# Patient Record
Sex: MALE | Race: WHITE | NOT HISPANIC OR LATINO | Employment: OTHER | URBAN - METROPOLITAN AREA
[De-identification: names, ages, dates, MRNs, and addresses within clinical notes are randomized per-mention and may not be internally consistent; named-entity substitution may affect disease eponyms.]

---

## 2023-08-01 ENCOUNTER — HOSPITAL ENCOUNTER (OUTPATIENT)
Facility: HOSPITAL | Age: 63
Setting detail: OBSERVATION
Discharge: HOME | End: 2023-08-03
Attending: EMERGENCY MEDICINE | Admitting: INTERNAL MEDICINE
Payer: COMMERCIAL

## 2023-08-01 ENCOUNTER — APPOINTMENT (OUTPATIENT)
Dept: GENERAL RADIOLOGY | Facility: HOSPITAL | Age: 63
End: 2023-08-01
Payer: COMMERCIAL

## 2023-08-01 DIAGNOSIS — I20.8 ANGINA AT REST: Primary | ICD-10-CM

## 2023-08-01 DIAGNOSIS — R07.9 CHEST PAIN, UNSPECIFIED TYPE: ICD-10-CM

## 2023-08-01 DIAGNOSIS — E78.5 DYSLIPIDEMIA: ICD-10-CM

## 2023-08-01 DIAGNOSIS — I10 ESSENTIAL HYPERTENSION: ICD-10-CM

## 2023-08-01 LAB
ANION GAP SERPL CALCULATED.3IONS-SCNC: 15 MMOL/L (ref 5–15)
BASOPHILS # BLD AUTO: 0.1 10*3/MM3 (ref 0–0.2)
BASOPHILS NFR BLD AUTO: 1 % (ref 0–1.5)
BUN SERPL-MCNC: 16 MG/DL (ref 8–23)
BUN/CREAT SERPL: 14.2 (ref 7–25)
CALCIUM SPEC-SCNC: 9.4 MG/DL (ref 8.6–10.5)
CHLORIDE SERPL-SCNC: 95 MMOL/L (ref 98–107)
CO2 SERPL-SCNC: 25 MMOL/L (ref 22–29)
CREAT SERPL-MCNC: 1.13 MG/DL (ref 0.76–1.27)
D DIMER PPP FEU-MCNC: 0.44 MG/L (FEU) (ref 0–0.63)
DEPRECATED RDW RBC AUTO: 44.6 FL (ref 37–54)
EGFRCR SERPLBLD CKD-EPI 2021: 73 ML/MIN/1.73
EOSINOPHIL # BLD AUTO: 0.2 10*3/MM3 (ref 0–0.4)
EOSINOPHIL NFR BLD AUTO: 2.7 % (ref 0.3–6.2)
ERYTHROCYTE [DISTWIDTH] IN BLOOD BY AUTOMATED COUNT: 12.8 % (ref 12.3–15.4)
GEN 5 2HR TROPONIN T REFLEX: 43 NG/L
GLUCOSE SERPL-MCNC: 101 MG/DL (ref 65–99)
HCT VFR BLD AUTO: 39.4 % (ref 37.5–51)
HGB BLD-MCNC: 13.4 G/DL (ref 13–17.7)
LYMPHOCYTES # BLD AUTO: 1.7 10*3/MM3 (ref 0.7–3.1)
LYMPHOCYTES NFR BLD AUTO: 20.9 % (ref 19.6–45.3)
MCH RBC QN AUTO: 34.4 PG (ref 26.6–33)
MCHC RBC AUTO-ENTMCNC: 34 G/DL (ref 31.5–35.7)
MCV RBC AUTO: 101 FL (ref 79–97)
MONOCYTES # BLD AUTO: 0.9 10*3/MM3 (ref 0.1–0.9)
MONOCYTES NFR BLD AUTO: 11.6 % (ref 5–12)
NEUTROPHILS NFR BLD AUTO: 5.1 10*3/MM3 (ref 1.7–7)
NEUTROPHILS NFR BLD AUTO: 63.8 % (ref 42.7–76)
NRBC BLD AUTO-RTO: 0 /100 WBC (ref 0–0.2)
PLATELET # BLD AUTO: 218 10*3/MM3 (ref 140–450)
PMV BLD AUTO: 7.7 FL (ref 6–12)
POTASSIUM SERPL-SCNC: 4.5 MMOL/L (ref 3.5–5.2)
RBC # BLD AUTO: 3.9 10*6/MM3 (ref 4.14–5.8)
SODIUM SERPL-SCNC: 135 MMOL/L (ref 136–145)
TROPONIN T DELTA: 14 NG/L
TROPONIN T SERPL HS-MCNC: 29 NG/L
WBC NRBC COR # BLD: 8.1 10*3/MM3 (ref 3.4–10.8)

## 2023-08-01 PROCEDURE — 85025 COMPLETE CBC W/AUTO DIFF WBC: CPT | Performed by: EMERGENCY MEDICINE

## 2023-08-01 PROCEDURE — 36415 COLL VENOUS BLD VENIPUNCTURE: CPT

## 2023-08-01 PROCEDURE — 93005 ELECTROCARDIOGRAM TRACING: CPT | Performed by: EMERGENCY MEDICINE

## 2023-08-01 PROCEDURE — G0378 HOSPITAL OBSERVATION PER HR: HCPCS

## 2023-08-01 PROCEDURE — 85379 FIBRIN DEGRADATION QUANT: CPT | Performed by: EMERGENCY MEDICINE

## 2023-08-01 PROCEDURE — 84484 ASSAY OF TROPONIN QUANT: CPT | Performed by: EMERGENCY MEDICINE

## 2023-08-01 PROCEDURE — 71045 X-RAY EXAM CHEST 1 VIEW: CPT

## 2023-08-01 PROCEDURE — 80048 BASIC METABOLIC PNL TOTAL CA: CPT | Performed by: EMERGENCY MEDICINE

## 2023-08-01 PROCEDURE — 99284 EMERGENCY DEPT VISIT MOD MDM: CPT

## 2023-08-01 RX ORDER — POLYETHYLENE GLYCOL 3350 17 G/17G
17 POWDER, FOR SOLUTION ORAL DAILY PRN
Status: DISCONTINUED | OUTPATIENT
Start: 2023-08-01 | End: 2023-08-03 | Stop reason: HOSPADM

## 2023-08-01 RX ORDER — ONDANSETRON 2 MG/ML
4 INJECTION INTRAMUSCULAR; INTRAVENOUS EVERY 6 HOURS PRN
Status: DISCONTINUED | OUTPATIENT
Start: 2023-08-01 | End: 2023-08-03 | Stop reason: HOSPADM

## 2023-08-01 RX ORDER — MELATONIN
1000 DAILY
Status: DISCONTINUED | OUTPATIENT
Start: 2023-08-02 | End: 2023-08-02

## 2023-08-01 RX ORDER — ASPIRIN 81 MG/1
81 TABLET, CHEWABLE ORAL NIGHTLY
Status: DISCONTINUED | OUTPATIENT
Start: 2023-08-01 | End: 2023-08-03 | Stop reason: HOSPADM

## 2023-08-01 RX ORDER — ACETAMINOPHEN 325 MG/1
650 TABLET ORAL EVERY 6 HOURS PRN
Status: DISCONTINUED | OUTPATIENT
Start: 2023-08-01 | End: 2023-08-03 | Stop reason: HOSPADM

## 2023-08-01 RX ORDER — NITROGLYCERIN 0.4 MG/1
0.4 TABLET SUBLINGUAL
Status: DISCONTINUED | OUTPATIENT
Start: 2023-08-01 | End: 2023-08-03 | Stop reason: HOSPADM

## 2023-08-01 RX ORDER — SODIUM CHLORIDE 0.9 % (FLUSH) 0.9 %
10 SYRINGE (ML) INJECTION AS NEEDED
Status: DISCONTINUED | OUTPATIENT
Start: 2023-08-01 | End: 2023-08-03 | Stop reason: HOSPADM

## 2023-08-01 RX ORDER — PROPRANOLOL HCL 60 MG
60 CAPSULE, EXTENDED RELEASE 24HR ORAL DAILY
COMMUNITY

## 2023-08-01 RX ORDER — ACETAMINOPHEN 325 MG/1
650 TABLET ORAL EVERY 6 HOURS PRN
Status: DISCONTINUED | OUTPATIENT
Start: 2023-08-01 | End: 2023-08-01 | Stop reason: SDUPTHER

## 2023-08-01 RX ORDER — SODIUM CHLORIDE 0.9 % (FLUSH) 0.9 %
10 SYRINGE (ML) INJECTION EVERY 12 HOURS SCHEDULED
Status: DISCONTINUED | OUTPATIENT
Start: 2023-08-02 | End: 2023-08-03 | Stop reason: HOSPADM

## 2023-08-01 RX ORDER — ROSUVASTATIN CALCIUM 5 MG/1
5 TABLET, COATED ORAL DAILY
COMMUNITY

## 2023-08-01 RX ORDER — BISACODYL 5 MG/1
5 TABLET, DELAYED RELEASE ORAL DAILY PRN
Status: DISCONTINUED | OUTPATIENT
Start: 2023-08-01 | End: 2023-08-03 | Stop reason: HOSPADM

## 2023-08-01 RX ORDER — ROSUVASTATIN CALCIUM 5 MG/1
5 TABLET, COATED ORAL NIGHTLY
Status: DISCONTINUED | OUTPATIENT
Start: 2023-08-01 | End: 2023-08-03 | Stop reason: HOSPADM

## 2023-08-01 RX ORDER — MELATONIN
1000 DAILY
COMMUNITY

## 2023-08-01 RX ORDER — LOSARTAN POTASSIUM 50 MG/1
100 TABLET ORAL NIGHTLY
Status: DISCONTINUED | OUTPATIENT
Start: 2023-08-01 | End: 2023-08-03 | Stop reason: HOSPADM

## 2023-08-01 RX ORDER — CLOTRIMAZOLE AND BETAMETHASONE DIPROPIONATE 10; .64 MG/G; MG/G
1 CREAM TOPICAL 2 TIMES DAILY PRN
Status: DISCONTINUED | OUTPATIENT
Start: 2023-08-01 | End: 2023-08-03 | Stop reason: HOSPADM

## 2023-08-01 RX ORDER — BISACODYL 10 MG
10 SUPPOSITORY, RECTAL RECTAL DAILY PRN
Status: DISCONTINUED | OUTPATIENT
Start: 2023-08-01 | End: 2023-08-03 | Stop reason: HOSPADM

## 2023-08-01 RX ORDER — LOSARTAN POTASSIUM 50 MG/1
100 TABLET ORAL DAILY
COMMUNITY

## 2023-08-01 RX ORDER — ASPIRIN 81 MG/1
81 TABLET, CHEWABLE ORAL DAILY
COMMUNITY

## 2023-08-01 RX ORDER — FOLIC ACID 1 MG/1
1 TABLET ORAL DAILY
COMMUNITY

## 2023-08-01 RX ORDER — SODIUM CHLORIDE 9 MG/ML
40 INJECTION, SOLUTION INTRAVENOUS AS NEEDED
Status: DISCONTINUED | OUTPATIENT
Start: 2023-08-01 | End: 2023-08-03 | Stop reason: HOSPADM

## 2023-08-01 RX ORDER — FAMOTIDINE 20 MG/1
20 TABLET, FILM COATED ORAL 2 TIMES DAILY
COMMUNITY

## 2023-08-01 RX ORDER — PROPRANOLOL HCL 60 MG
60 CAPSULE, EXTENDED RELEASE 24HR ORAL NIGHTLY
Status: DISCONTINUED | OUTPATIENT
Start: 2023-08-01 | End: 2023-08-03 | Stop reason: HOSPADM

## 2023-08-01 RX ORDER — AMOXICILLIN 250 MG
2 CAPSULE ORAL 2 TIMES DAILY
Status: DISCONTINUED | OUTPATIENT
Start: 2023-08-02 | End: 2023-08-03 | Stop reason: HOSPADM

## 2023-08-01 RX ORDER — FAMOTIDINE 20 MG/1
20 TABLET, FILM COATED ORAL 2 TIMES DAILY
Status: DISCONTINUED | OUTPATIENT
Start: 2023-08-01 | End: 2023-08-03 | Stop reason: HOSPADM

## 2023-08-01 RX ORDER — FOLIC ACID 1 MG/1
1 TABLET ORAL NIGHTLY
Status: DISCONTINUED | OUTPATIENT
Start: 2023-08-01 | End: 2023-08-03 | Stop reason: HOSPADM

## 2023-08-02 PROBLEM — E78.5 DYSLIPIDEMIA: Status: ACTIVE | Noted: 2023-08-01

## 2023-08-02 PROBLEM — I10 ESSENTIAL HYPERTENSION: Status: ACTIVE | Noted: 2023-08-01

## 2023-08-02 LAB
CHOLEST SERPL-MCNC: 153 MG/DL (ref 0–200)
HBA1C MFR BLD: 5.1 % (ref 4.8–5.6)
HDLC SERPL-MCNC: 75 MG/DL (ref 40–60)
LDLC SERPL CALC-MCNC: 56 MG/DL (ref 0–100)
LDLC/HDLC SERPL: 0.7 {RATIO}
MAGNESIUM SERPL-MCNC: 1.1 MG/DL (ref 1.6–2.4)
MAGNESIUM SERPL-MCNC: 1.7 MG/DL (ref 1.6–2.4)
QT INTERVAL: 379 MS
TRIGL SERPL-MCNC: 128 MG/DL (ref 0–150)
TROPONIN T SERPL HS-MCNC: 22 NG/L
VLDLC SERPL-MCNC: 22 MG/DL (ref 5–40)

## 2023-08-02 PROCEDURE — 25010000002 FENTANYL CITRATE (PF) 100 MCG/2ML SOLUTION: Performed by: INTERNAL MEDICINE

## 2023-08-02 PROCEDURE — 99152 MOD SED SAME PHYS/QHP 5/>YRS: CPT | Performed by: INTERNAL MEDICINE

## 2023-08-02 PROCEDURE — 25010000002 MIDAZOLAM PER 1 MG: Performed by: INTERNAL MEDICINE

## 2023-08-02 PROCEDURE — 93010 ELECTROCARDIOGRAM REPORT: CPT | Performed by: INTERNAL MEDICINE

## 2023-08-02 PROCEDURE — 84484 ASSAY OF TROPONIN QUANT: CPT | Performed by: NURSE PRACTITIONER

## 2023-08-02 PROCEDURE — 83036 HEMOGLOBIN GLYCOSYLATED A1C: CPT | Performed by: NURSE PRACTITIONER

## 2023-08-02 PROCEDURE — 96366 THER/PROPH/DIAG IV INF ADDON: CPT

## 2023-08-02 PROCEDURE — 25510000001 IOPAMIDOL PER 1 ML: Performed by: INTERNAL MEDICINE

## 2023-08-02 PROCEDURE — 25010000002 MAGNESIUM SULFATE 2 GM/50ML SOLUTION: Performed by: INTERNAL MEDICINE

## 2023-08-02 PROCEDURE — 80061 LIPID PANEL: CPT | Performed by: NURSE PRACTITIONER

## 2023-08-02 PROCEDURE — 93458 L HRT ARTERY/VENTRICLE ANGIO: CPT | Performed by: INTERNAL MEDICINE

## 2023-08-02 PROCEDURE — G0378 HOSPITAL OBSERVATION PER HR: HCPCS

## 2023-08-02 PROCEDURE — 0 LIDOCAINE 1 % SOLUTION: Performed by: INTERNAL MEDICINE

## 2023-08-02 PROCEDURE — 93005 ELECTROCARDIOGRAM TRACING: CPT | Performed by: INTERNAL MEDICINE

## 2023-08-02 PROCEDURE — 99204 OFFICE O/P NEW MOD 45 MIN: CPT | Performed by: INTERNAL MEDICINE

## 2023-08-02 PROCEDURE — 83735 ASSAY OF MAGNESIUM: CPT | Performed by: INTERNAL MEDICINE

## 2023-08-02 PROCEDURE — C1894 INTRO/SHEATH, NON-LASER: HCPCS | Performed by: INTERNAL MEDICINE

## 2023-08-02 PROCEDURE — 96365 THER/PROPH/DIAG IV INF INIT: CPT

## 2023-08-02 PROCEDURE — C1769 GUIDE WIRE: HCPCS | Performed by: INTERNAL MEDICINE

## 2023-08-02 RX ORDER — MIDAZOLAM HYDROCHLORIDE 1 MG/ML
INJECTION INTRAMUSCULAR; INTRAVENOUS
Status: DISCONTINUED | OUTPATIENT
Start: 2023-08-02 | End: 2023-08-02 | Stop reason: HOSPADM

## 2023-08-02 RX ORDER — SODIUM CHLORIDE 0.9 % (FLUSH) 0.9 %
3-10 SYRINGE (ML) INJECTION AS NEEDED
Status: DISCONTINUED | OUTPATIENT
Start: 2023-08-02 | End: 2023-08-02

## 2023-08-02 RX ORDER — DIPHENHYDRAMINE HCL 25 MG
25 CAPSULE ORAL EVERY 6 HOURS PRN
Status: DISCONTINUED | OUTPATIENT
Start: 2023-08-02 | End: 2023-08-03 | Stop reason: HOSPADM

## 2023-08-02 RX ORDER — SODIUM CHLORIDE 9 MG/ML
250 INJECTION, SOLUTION INTRAVENOUS ONCE AS NEEDED
Status: DISCONTINUED | OUTPATIENT
Start: 2023-08-02 | End: 2023-08-03 | Stop reason: HOSPADM

## 2023-08-02 RX ORDER — ACETAMINOPHEN 325 MG/1
650 TABLET ORAL EVERY 4 HOURS PRN
Status: DISCONTINUED | OUTPATIENT
Start: 2023-08-02 | End: 2023-08-03 | Stop reason: HOSPADM

## 2023-08-02 RX ORDER — SODIUM CHLORIDE 9 MG/ML
INJECTION, SOLUTION INTRAVENOUS
Status: COMPLETED | OUTPATIENT
Start: 2023-08-02 | End: 2023-08-02

## 2023-08-02 RX ORDER — ONDANSETRON 2 MG/ML
4 INJECTION INTRAMUSCULAR; INTRAVENOUS EVERY 6 HOURS PRN
Status: DISCONTINUED | OUTPATIENT
Start: 2023-08-02 | End: 2023-08-03 | Stop reason: HOSPADM

## 2023-08-02 RX ORDER — MELATONIN
1000 NIGHTLY
Status: DISCONTINUED | OUTPATIENT
Start: 2023-08-03 | End: 2023-08-03 | Stop reason: HOSPADM

## 2023-08-02 RX ORDER — ONDANSETRON 4 MG/1
4 TABLET, FILM COATED ORAL EVERY 6 HOURS PRN
Status: DISCONTINUED | OUTPATIENT
Start: 2023-08-02 | End: 2023-08-03 | Stop reason: HOSPADM

## 2023-08-02 RX ORDER — LIDOCAINE HYDROCHLORIDE 10 MG/ML
INJECTION, SOLUTION INFILTRATION; PERINEURAL
Status: DISCONTINUED | OUTPATIENT
Start: 2023-08-02 | End: 2023-08-02 | Stop reason: HOSPADM

## 2023-08-02 RX ORDER — LANOLIN ALCOHOL/MO/W.PET/CERES
1000 CREAM (GRAM) TOPICAL DAILY
Status: DISCONTINUED | OUTPATIENT
Start: 2023-08-02 | End: 2023-08-03 | Stop reason: HOSPADM

## 2023-08-02 RX ORDER — FENTANYL CITRATE 50 UG/ML
INJECTION, SOLUTION INTRAMUSCULAR; INTRAVENOUS
Status: DISCONTINUED | OUTPATIENT
Start: 2023-08-02 | End: 2023-08-02 | Stop reason: HOSPADM

## 2023-08-02 RX ORDER — SODIUM CHLORIDE 9 MG/ML
40 INJECTION, SOLUTION INTRAVENOUS AS NEEDED
Status: DISCONTINUED | OUTPATIENT
Start: 2023-08-02 | End: 2023-08-02

## 2023-08-02 RX ORDER — MAGNESIUM SULFATE HEPTAHYDRATE 40 MG/ML
2 INJECTION, SOLUTION INTRAVENOUS ONCE
Status: COMPLETED | OUTPATIENT
Start: 2023-08-02 | End: 2023-08-02

## 2023-08-02 RX ORDER — SODIUM CHLORIDE 0.9 % (FLUSH) 0.9 %
3 SYRINGE (ML) INJECTION EVERY 12 HOURS SCHEDULED
Status: DISCONTINUED | OUTPATIENT
Start: 2023-08-02 | End: 2023-08-02

## 2023-08-02 RX ADMIN — PROPRANOLOL HYDROCHLORIDE 60 MG: 60 CAPSULE, EXTENDED RELEASE ORAL at 20:21

## 2023-08-02 RX ADMIN — Medication 10 ML: at 08:40

## 2023-08-02 RX ADMIN — ROSUVASTATIN CALCIUM 5 MG: 5 TABLET, COATED ORAL at 20:21

## 2023-08-02 RX ADMIN — FAMOTIDINE 20 MG: 20 TABLET, FILM COATED ORAL at 08:40

## 2023-08-02 RX ADMIN — LOSARTAN POTASSIUM 100 MG: 50 TABLET, FILM COATED ORAL at 20:21

## 2023-08-02 RX ADMIN — ASPIRIN 81 MG: 81 TABLET, CHEWABLE ORAL at 17:58

## 2023-08-02 RX ADMIN — CYANOCOBALAMIN TAB 1000 MCG 1000 MCG: 1000 TAB at 09:23

## 2023-08-02 RX ADMIN — MAGNESIUM SULFATE HEPTAHYDRATE 2 G: 40 INJECTION, SOLUTION INTRAVENOUS at 09:23

## 2023-08-02 RX ADMIN — Medication 3 ML: at 09:24

## 2023-08-02 RX ADMIN — FAMOTIDINE 20 MG: 20 TABLET, FILM COATED ORAL at 17:20

## 2023-08-02 RX ADMIN — Medication 10 ML: at 00:33

## 2023-08-02 RX ADMIN — FOLIC ACID 1 MG: 1 TABLET ORAL at 20:21

## 2023-08-02 RX ADMIN — SENNOSIDES AND DOCUSATE SODIUM 2 TABLET: 50; 8.6 TABLET ORAL at 20:21

## 2023-08-02 RX ADMIN — Medication 10 ML: at 20:21

## 2023-08-02 NOTE — PROGRESS NOTES
[facility] Medicine Services   Daily Progress Note    Patient Name: Bijan Patel  : 1960  MRN: [id number]  Primary Care Physician:  Provider, No Known  Date of admission: 2023  Date and Time of Service: 23 at 0800      Subjective      Chief Complaint: chest pain    Patient seen and examined this am, no chest pain. Seen by cardiology-for C    ROS   Constitutional: Positive for diaphoresis.   Cardiovascular:  Positive for chest pain.        Extreme fatigue with activity   All other systems reviewed and are negative      Objective      Vitals:   Temp:  [97.7 øF (36.5 øC)-98.5 øF (36.9 øC)] 97.7 øF (36.5 øC)  Heart Rate:  [71-88] 84  Resp:  [15-18] 15  BP: (112-140)/(62-84) 112/68    Physical Exam   Constitutional:       Appearance: Normal appearance.   Eyes:      Pupils: Pupils are equal, round, and reactive to light.   Cardiovascular:      Rate and Rhythm: Normal rate and regular rhythm.   Pulmonary:      Effort: Pulmonary effort is normal.      Breath sounds: Normal breath sounds.   Abdominal:      Palpations: Abdomen is soft.   Musculoskeletal:         General: Normal range of motion.   Skin:     General: Skin is warm and dry.   Neurological:      Mental Status: He is alert and oriented to person, place, and time.   Psychiatric:         Behavior: Behavior normal.         Result Review    Result Review:  I have personally reviewed the results from the time of this admission to 2023 08:29 EDT and agree with these findings:  [x]  Laboratory  []  Microbiology  [x]  Radiology  []  EKG/Telemetry   []  Cardiology/Vascular   []  Pathology  []  Old records  []  Other:        Assessment & Plan      Brief Patient Summary:  Bijan Patel is a 62 y.o. male with PMHx of HTN HLD who presents with chest pain. Had normal stress test a few months ago. Troponin is mildly elevated. Seen by cardiology and will undergo cardiac catheterization later today.      aspirin, 81 mg, Oral,  Nightly  cholecalciferol, 1,000 Units, Oral, Daily  famotidine, 20 mg, Oral, BID  folic acid, 1 mg, Oral, Nightly  losartan, 100 mg, Oral, Nightly  propranolol LA, 60 mg, Oral, Nightly  rosuvastatin, 5 mg, Oral, Nightly  senna-docusate sodium, 2 tablet, Oral, BID  sodium chloride, 10 mL, Intravenous, Q12H  sodium chloride, 3 mL, Intravenous, Q12H             Active Hospital Problems:  Active Hospital Problems    Diagnosis     **Chest pain      Plan:   Chest pain, rule out ACS  - Chest pain now resolved  - continue tele  - pt reports had exercise stress, echo, and PFTs but unable to view results from outlying facility. Pt reports new symptoms of fatigue and intermittent episodes of chest pain  - seen by cardiology - cardiac cath later today  - trend troponin  - EKG with non-specific findings. Will monitor  - Ok for light breakfast for cardiac cath this evening     Chronic medical condition of HTN, HLD, GERD  - stable  - continue home medication    DVT prophylaxis:  Mechanical DVT prophylaxis orders are present.    CODE STATUS:    Code Status (Patient has no pulse and is not breathing): CPR (Attempt to Resuscitate)  Medical Interventions (Patient has pulse or is breathing): Full Support  Release to patient: Routine Release      Disposition:  I expect patient to be discharged in 1-2 days.    Electronically signed by Matt Loera MD, 08/02/23, 08:29 EDT.  [facility] Hospitalist Team

## 2023-08-02 NOTE — PLAN OF CARE
Goal Outcome Evaluation:  Plan of Care Reviewed With: patient           Outcome Evaluation: Pt admitted for chest pain. Pt has slept throughout the night, no c/o chest pain at this time. Pt is currently NPO for Cardiology consult in AM. Will continue to monitor.

## 2023-08-02 NOTE — PLAN OF CARE
Goal Outcome Evaluation:   Pt a/o. Room air. Spouse at bedside. Up ad holden.Cardiology following. Heart cath this afternoon. No complaints. Magnesium replaced. Call light in reach.

## 2023-08-02 NOTE — H&P
"    [facility] Medicine Services  History & Physical    Patient Name: Bijan Patel  : 1960  MRN: [id number]  Primary Care Physician:  Provider, No Known  Date of admission: 2023  Date and Time of Service: 23 at 2240    Subjective      Chief Complaint: Chest pain    History of Present Illness: Bijan Patel is a 62 y.o. male with past medical history acid reflux, hypertension, hyperlipidemia and celiac's disease and diagnosis of COVID-19 in March who presented to [facility] on 2023 complaining of chest pain    Patient reports his primary residence is in [place] but he also spends 6 months of the year in [place].  He was driving through the area on the way home from [place] when he had acute onset of chest pain that caused him to pull to the side of the road and called EMS for help.  Patient verbalizes that approximately 3 months ago he has had extensive work-up in [place] by Dr.Vivek Robison cardiologist in [place] [phone].  Patient reports underwent a treadmill stress test, sonogram that only showed \"plaque\".  He is followed by pulmonologist also and has had CAT scans chest x-rays and pulmonary function test.  He is pending sleep apnea test.    Patient reports was riding in the passenger seat of the vehicle when he all of a sudden had mid chest pain that intensified to a sharp pain and radiated down both arms and fingers and up to the neck.  He reports he had 3 \"attacks\" within an hour and a half that lasted approximately 12 to 13 minutes each.  Pain was associated with diaphoresis.  Denies any nausea or vomiting headache or vision changes.  By the time EMS arrived pain had subsided.  Patient reports been having worsening symptoms of \"exhaustion\" with activity.  Difficulty ambulating up a flight of stairs.  Denies any tobacco use.  Uses alcohol socially.  Father with heart disease in his 70s and a CVA in his 60s.      Review of Systems   Constitutional: Positive for " diaphoresis.   Cardiovascular:  Positive for chest pain.        Extreme fatigue with activity   All other systems reviewed and are negative.     Personal History     Past Medical History:   Diagnosis Date    Celiac disease     GERD (gastroesophageal reflux disease)     Hyperlipidemia     Hypertension        Past Surgical History:   Procedure Laterality Date    CHOLECYSTECTOMY      COMMON BILE DUCT EXPLORATION      common bile duct removed    ERCP      ROTATOR CUFF REPAIR         Family History: family history is not on file. Otherwise pertinent FHx was reviewed and not pertinent to current issue.    Social History:  reports that he has never smoked. He has never used smokeless tobacco. He reports current alcohol use of about 9.0 standard drinks per week. He reports that he does not use drugs.    Home Medications:  Prior to Admission Medications       Prescriptions Last Dose Informant Patient Reported? Taking?    aspirin 81 MG chewable tablet  Self Yes No    Chew 1 tablet Daily.    Cholecalciferol 25 MCG (1000 UT) tablet  Self Yes No    Take 1 tablet by mouth Daily.    famotidine (PEPCID) 20 MG tablet  Self Yes No    Take 1 tablet by mouth 2 (Two) Times a Day.    folic acid (FOLVITE) 1 MG tablet  Self Yes No    Take 1 tablet by mouth Daily.    losartan (COZAAR) 50 MG tablet  Self Yes No    Take 2 tablets by mouth Daily.    propranolol LA (INDERAL LA) 60 MG 24 hr capsule  Self Yes No    Take 1 capsule by mouth Daily.    rosuvastatin (CRESTOR) 5 MG tablet  Self Yes No    Take 1 tablet by mouth Daily.              Allergies:  Allergies   Allergen Reactions    Penicillins Hives       Objective      Vitals:   Temp:  [97.8 øF (36.6 øC)] 97.8 øF (36.6 øC)  Heart Rate:  [71-88] 88  Resp:  [17] 17  BP: (123-140)/(62-84) 132/78    Physical Exam  Constitutional:       Appearance: Normal appearance.   Eyes:      Pupils: Pupils are equal, round, and reactive to light.   Cardiovascular:      Rate and Rhythm: Normal rate and  regular rhythm.   Pulmonary:      Effort: Pulmonary effort is normal.      Breath sounds: Normal breath sounds.   Abdominal:      Palpations: Abdomen is soft.   Musculoskeletal:         General: Normal range of motion.   Skin:     General: Skin is warm and dry.   Neurological:      Mental Status: He is alert and oriented to person, place, and time.   Psychiatric:         Behavior: Behavior normal.        Result Review    Result Review:  I have personally reviewed the results from the time of this admission to 8/1/2023 23:52 EDT and agree with these findings:  [x]  Laboratory  []  Microbiology  [x]  Radiology  [x]  EKG/Telemetry   []  Cardiology/Vascular   []  Pathology  []  Old records  []  Other:  Most notable findings include:   CMP          8/1/2023    19:05   CMP   Glucose 101    BUN 16    Creatinine 1.13    EGFR 73.0    Sodium 135    Potassium 4.5    Chloride 95    Calcium 9.4    BUN/Creatinine Ratio 14.2    Anion Gap 15.0       CBC          8/1/2023    19:05   CBC   WBC 8.10    RBC 3.90    Hemoglobin 13.4    Hematocrit 39.4    .0    MCH 34.4    MCHC 34.0    RDW 12.8    Platelets 218       Troponin 29->43    CXR no acute pulmonary process      Assessment & Plan        Active Hospital Problems:  Active Hospital Problems    Diagnosis     **Chest pain      Plan:   Chest pain, rule out ACS  - Chest pain now resolved  - continue tele  - pt reports had exercise stress, echo, and PFTs but unable to view results from outlying facility. Pt reports new symptoms of fatigue and intermittent episodes of chest pain  - cardiology consult for possible cardiac cath  - trend troponin  - EKG with non-specific findings. Will monitor  -NPO after midnight    Chronic medical condition of HTN, HLD, GERD  - stable  - continue home medication    DVT prophylaxis:  Mechanical DVT prophylaxis orders are present.    CODE STATUS:    Code Status (Patient has no pulse and is not breathing): CPR (Attempt to Resuscitate)  Medical  Interventions (Patient has pulse or is breathing): Full Support  Release to patient: Routine Release    Admission Status:  I believe this patient meets observation status.    I discussed the patient's findings and my recommendations with patient.    This patient has been examined wearing appropriate Personal Protective Equipment       Signature: Electronically signed by HIREN White, 08/01/23, 23:52 EDT.  [facility] Hospitalist Team

## 2023-08-02 NOTE — ED NOTES
Pt had medication organizer with him. Educated to not take home medication here and wife took organizer to put in car. Both verbalized understanding. Provider also educated about this

## 2023-08-02 NOTE — CONSULTS
Referring Provider: Benton Sargent DO  Reason for Consultation:  Chest pain  Hypertension  Dyslipidemia    Patient Care Team:  Provider, No Known as PCP - General    Chief complaint  Chest pain    Subjective .     History of present illness:  Bijan Patel is a 62 y.o. male who presents with history of multiple medical problems including hypertension GERD dyslipidemia celiac disease and status post COVID 19 March 2023 was admitted to the hospital with history of chest pain.  Patient was in [place] and on the way back to [place] his home place and he started developing chest pain while he was in the car as a passenger.  Patient apparently spends 6 months in [place] and had cardiac work-up performed by cardiologist locally.  CT scan that showed calcium score of 700 and had negative stress Cardiolite test (by history) and apparently normal echocardiogram.  Patient was active and walking around and did not have any chest pain.  On the way patient developed recurrent episodes of chest pain substernal dull sharp pain and some heaviness not associated with any sweating nausea.  Apparently was lasting for few minutes and subsequently was lasting longer.  Patient was brought to the hospital.  EKG showed no acute changes.  Minimal elevation of troponin at 43 with a delta of 14.  Cardiology consultation was requested.          ROS      Patient is not having any shortness of breath, palpitations, dizziness or syncope.  Denies having any headache, abdominal pain, nausea, vomiting, diarrhea, constipation, loss of weight or loss of appetite.  Denies having any excessive bruising, hematuria or blood in the stool.    Review of all systems negative except as indicated      History  Past Medical History:   Diagnosis Date    Celiac disease     GERD (gastroesophageal reflux disease)     Hyperlipidemia     Hypertension        Past Surgical History:   Procedure Laterality Date    CHOLECYSTECTOMY      COMMON BILE DUCT EXPLORATION       common bile duct removed    ERCP      ROTATOR CUFF REPAIR         History reviewed. No pertinent family history.    Social History     Tobacco Use    Smoking status: Never    Smokeless tobacco: Never   Vaping Use    Vaping Use: Never used   Substance Use Topics    Alcohol use: Yes     Alcohol/week: 9.0 standard drinks     Types: 9 Shots of liquor per week    Drug use: Never        Medications Prior to Admission   Medication Sig Dispense Refill Last Dose    aspirin 81 MG chewable tablet Chew 1 tablet Daily.       Cholecalciferol 25 MCG (1000 UT) tablet Take 1 tablet by mouth Daily.       famotidine (PEPCID) 20 MG tablet Take 1 tablet by mouth 2 (Two) Times a Day.       folic acid (FOLVITE) 1 MG tablet Take 1 tablet by mouth Daily.       losartan (COZAAR) 50 MG tablet Take 2 tablets by mouth Daily.       propranolol LA (INDERAL LA) 60 MG 24 hr capsule Take 1 capsule by mouth Daily.       rosuvastatin (CRESTOR) 5 MG tablet Take 1 tablet by mouth Daily.            Penicillins    Scheduled Meds:aspirin, 81 mg, Oral, Nightly  cholecalciferol, 1,000 Units, Oral, Daily  famotidine, 20 mg, Oral, BID  folic acid, 1 mg, Oral, Nightly  losartan, 100 mg, Oral, Nightly  propranolol LA, 60 mg, Oral, Nightly  rosuvastatin, 5 mg, Oral, Nightly  senna-docusate sodium, 2 tablet, Oral, BID  sodium chloride, 10 mL, Intravenous, Q12H      Continuous Infusions:   PRN Meds:.  acetaminophen    senna-docusate sodium **AND** polyethylene glycol **AND** bisacodyl **AND** bisacodyl    clotrimazole-betamethasone    nitroglycerin    ondansetron    [COMPLETED] Insert Peripheral IV **AND** sodium chloride    sodium chloride    sodium chloride    Objective     VITAL SIGNS  Vitals:    08/01/23 2130 08/01/23 2145 08/02/23 0011 08/02/23 0343   BP: 140/84 132/78 135/72 112/68   BP Location:   Left arm Left arm   Patient Position:   Lying Lying   Pulse: 80 88 76 84   Resp:   18 15   Temp:   98.5 øF (36.9 øC) 97.7 øF (36.5 øC)   TempSrc:   Oral Oral  "  SpO2: 99% 98% 97% 98%   Weight:       Height:           Flowsheet Rows      Flowsheet Row First Filed Value   Admission Height 182.9 cm (72\") Documented at 08/01/2023 1840   Admission Weight 79.4 kg (175 lb) Documented at 08/01/2023 1840            No intake or output data in the 24 hours ending 08/02/23 0624     TELEMETRY: Sinus rhythm    Physical Exam:  The patient is alert, oriented and in no distress.  Vital signs as noted above.  Head and neck revealed no carotid bruits or jugular venous distention.  No thyromegaly or lymphadenopathy is present  Lungs clear.  No wheezing.  Breath sounds are normal bilaterally.  Heart normal first and second heart sounds. No murmur.  No precordial rub is present.  No gallop is present.  Abdomen soft and nontender.  No organomegaly is present.  Extremities with good peripheral pulses without any pedal edema.  Skin warm and dry.  Musculoskeletal system is grossly normal  CNS grossly normal      Results Review:   I reviewed the patient's new clinical results.  Lab Results (last 24 hours)       Procedure Component Value Units Date/Time    High Sensitivity Troponin T 2Hr [[id number]]  (Abnormal) Collected: 08/01/23 2052    Specimen: Blood Updated: 08/01/23 2126     HS Troponin T 43 ng/L      Troponin T Delta 14 ng/L     Narrative:      High Sensitive Troponin T Reference Range:  <10.0 ng/L- Negative Female for AMI  <15.0 ng/L- Negative Male for AMI  >=10 - Abnormal Female indicating possible myocardial injury.  >=15 - Abnormal Male indicating possible myocardial injury.   Clinicians would have to utilize clinical acumen, EKG, Troponin, and serial changes to determine if it is an Acute Myocardial Infarction or myocardial injury due to an underlying chronic condition.         D-dimer, Quantitative [[id number]]  (Normal) Collected: 08/01/23 1905    Specimen: Blood Updated: 08/01/23 1937     D-Dimer, Quantitative 0.44 mg/L (FEU)     Narrative:      According to the assay 's " "published package insert, a normal (<0.50 mg/L (FEU)) D-dimer result in conjunction with a non-high clinical probability assessment, excludes deep vein thrombosis (DVT) and pulmonary embolism (PE) with high sensitivity.    D-dimer values increase with age and this can make VTE exclusion of an older population difficult. To address this, the [employer] College [employer], based on best available evidence and recent guidelines, recommends that clinicians use age-adjusted D-dimer thresholds in patients greater than 50 years of age with: a) a low probability of PE who do not meet all Pulmonary Embolism Rule Out Criteria, or b) in those with intermediate probability of PE.   The formula for an age-adjusted D-dimer cut-off is \"age/100\".  For example, a 60 year old patient would have an age-adjusted cut-off of 0.60 mg/L (FEU) and an 80 year old 0.80 mg/L (FEU).    Basic Metabolic Panel [[id number]]  (Abnormal) Collected: 08/01/23 1905    Specimen: Blood Updated: 08/01/23 1928     Glucose 101 mg/dL      BUN 16 mg/dL      Creatinine 1.13 mg/dL      Sodium 135 mmol/L      Potassium 4.5 mmol/L      Comment: Slight hemolysis detected by analyzer. Results may be affected.        Chloride 95 mmol/L      CO2 25.0 mmol/L      Calcium 9.4 mg/dL      BUN/Creatinine Ratio 14.2     Anion Gap 15.0 mmol/L      eGFR 73.0 mL/min/1.73     Narrative:      GFR Normal >60  Chronic Kidney Disease <60  Kidney Failure <15      High Sensitivity Troponin T [[id number]]  (Abnormal) Collected: 08/01/23 1905    Specimen: Blood Updated: 08/01/23 1928     HS Troponin T 29 ng/L     Narrative:      High Sensitive Troponin T Reference Range:  <10.0 ng/L- Negative Female for AMI  <15.0 ng/L- Negative Male for AMI  >=10 - Abnormal Female indicating possible myocardial injury.  >=15 - Abnormal Male indicating possible myocardial injury.   Clinicians would have to utilize clinical acumen, EKG, Troponin, and serial changes to determine if it is an Acute " Myocardial Infarction or myocardial injury due to an underlying chronic condition.         CBC & Differential [[id number]]  (Abnormal) Collected: 08/01/23 1905    Specimen: Blood Updated: 08/01/23 1910    Narrative:      The following orders were created for panel order CBC & Differential.  Procedure                               Abnormality         Status                     ---------                               -----------         ------                     CBC Auto Differential[[id number]]        Abnormal            Final result                 Please view results for these tests on the individual orders.    CBC Auto Differential [[id number]]  (Abnormal) Collected: 08/01/23 1905    Specimen: Blood Updated: 08/01/23 1910     WBC 8.10 10*3/mm3      RBC 3.90 10*6/mm3      Hemoglobin 13.4 g/dL      Hematocrit 39.4 %      .0 fL      MCH 34.4 pg      MCHC 34.0 g/dL      RDW 12.8 %      RDW-SD 44.6 fl      MPV 7.7 fL      Platelets 218 10*3/mm3      Neutrophil % 63.8 %      Lymphocyte % 20.9 %      Monocyte % 11.6 %      Eosinophil % 2.7 %      Basophil % 1.0 %      Neutrophils, Absolute 5.10 10*3/mm3      Lymphocytes, Absolute 1.70 10*3/mm3      Monocytes, Absolute 0.90 10*3/mm3      Eosinophils, Absolute 0.20 10*3/mm3      Basophils, Absolute 0.10 10*3/mm3      nRBC 0.0 /100 WBC             Imaging Results (Last 24 Hours)       Procedure Component Value Units Date/Time    XR Chest 1 View [[id number]] Collected: 08/01/23 1918     Updated: 08/01/23 1921    Narrative:      XR CHEST 1 VW    Date of Exam: 8/1/2023 7:12 PM EDT    Indication: Chest pain    Comparison: None available.    Findings:  The heart is not enlarged. The lungs seem clear. There are no pleural effusions.      Impression:      Impression:  1.An acute pulmonary process is not apparent.      Electronically Signed: Neville Kennedy    8/1/2023 7:19 PM EDT    Workstation ID: [id number]        LAB RESULTS (LAST 7 DAYS)    CBC  Results from last 7 days   Lab  Units 08/01/23  1905   WBC 10*3/mm3 8.10   RBC 10*6/mm3 3.90*   HEMOGLOBIN g/dL 13.4   HEMATOCRIT % 39.4   MCV fL 101.0*   PLATELETS 10*3/mm3 218       BMP  Results from last 7 days   Lab Units 08/01/23  1905   SODIUM mmol/L 135*   POTASSIUM mmol/L 4.5   CHLORIDE mmol/L 95*   CO2 mmol/L 25.0   BUN mg/dL 16   CREATININE mg/dL 1.13   GLUCOSE mg/dL 101*       CMP   Results from last 7 days   Lab Units 08/01/23  1905   SODIUM mmol/L 135*   POTASSIUM mmol/L 4.5   CHLORIDE mmol/L 95*   CO2 mmol/L 25.0   BUN mg/dL 16   CREATININE mg/dL 1.13   GLUCOSE mg/dL 101*         BNP        TROPONIN  Results from last 7 days   Lab Units 08/01/23 2052   HSTROP T ng/L 43*       CoAg        Creatinine Clearance  Estimated Creatinine Clearance: 75.1 mL/min (by C-G formula based on SCr of 1.13 mg/dL).    ABG        Radiology  XR Chest 1 View    Result Date: 8/1/2023  Impression: 1.An acute pulmonary process is not apparent. Electronically Signed: Neville Kennedy  8/1/2023 7:19 PM EDT  Workstation ID: [id number]       EKG      I personally viewed and interpreted the patient's EKG/Telemetry data: Sinus rhythm without ischemic changes    ECHOCARDIOGRAM:        STRESS TEST        Cardiolite (Tc-99m sestamibi) stress test    HEART CATHETERIZATION  No results found for this or any previous visit.      OTHER:     Assessment & Plan     Principal Problem:    Chest pain  ]]]]]]]]]]]]]]]]]]]]]  Impression  =================  -Recurrent episodes of chest pain at rest-possible unstable angina pectoris although symptoms are somewhat atypical.  EKG showed no acute changes.  High-sensitivity troponin 29 and 43 with a delta of 14.    History of negative stress Cardiolite test and normal echocardiogram-performed in [place].  (By history)    - Hypertension dyslipidemia celiac disease GERD.    - Status post COVID March 2023.    - Status post cholecystectomy, bile duct stone removal rotator cuff repair.    - Family history of coronary artery disease    -  Non-smoker    - Allergic to penicillin.  ================  Plan  ============  Patient has chest pain acrania unstable pattern although somewhat atypical.  EKG showed no acute changes.  Minimal elevation of high-sensitivity troponin with delta of 14.  Recent ischemic cardiac work-up was negative for myocardial ischemia April 2023.  (By history).  Reports not available.  In view of recurrent episodes of chest pain and minimal elevation of high-sensitivity troponin patient was advised cardiac catheterization and coronary arteriography for definite evaluation of coronary artery status.  Risks and benefits pros and cons of the procedure including infection bleeding blood clot heart attack stroke allergic reaction to the dye renal dysfunction etc. were discussed.  We will try to obtain records from [place] regarding ischemic cardiac work-up.  Further plan will depend on patient's progress.  ]]]]]]]]]]]]]]]]]]]]]]]         Neil Pineda MD  08/02/23  06:24 EDT

## 2023-08-02 NOTE — ED PROVIDER NOTES
Subjective   History of Present Illness  Patient is a 62-year-old male complaining of pain in [place] throughout the day today.  States pain became worse and had 3 episodes today lasting approximately 15 minutes each time.  He has had intermittent pain over the past 3 months and had a negative stress test.  He denies cough fever shortness of breath or other complaint    Review of Systems    No past medical history on file.    Allergies   Allergen Reactions    Penicillins Hives       No past surgical history on file.    No family history on file.    Social History     Socioeconomic History    Marital status:            Objective   Physical Exam  Neck has no adenopathy JVD or bruits.  Lungs are clear.  Heart has regular rhythm without murmur.  Chest is nontender.  Abdomen soft nontender.  Extremity exam unremarkable.  Procedures       My EKG interpretation was normal sinus rhythm at a rate of 69 with no acute ST change    ED Course      Results for orders placed or performed during the hospital encounter of 08/01/23   Basic Metabolic Panel    Specimen: Blood   Result Value Ref Range    Glucose 101 (H) 65 - 99 mg/dL    BUN 16 8 - 23 mg/dL    Creatinine 1.13 0.76 - 1.27 mg/dL    Sodium 135 (L) 136 - 145 mmol/L    Potassium 4.5 3.5 - 5.2 mmol/L    Chloride 95 (L) 98 - 107 mmol/L    CO2 25.0 22.0 - 29.0 mmol/L    Calcium 9.4 8.6 - 10.5 mg/dL    BUN/Creatinine Ratio 14.2 7.0 - 25.0    Anion Gap 15.0 5.0 - 15.0 mmol/L    eGFR 73.0 >60.0 mL/min/1.73   High Sensitivity Troponin T    Specimen: Blood   Result Value Ref Range    HS Troponin T 29 (H) <15 ng/L   D-dimer, Quantitative    Specimen: Blood   Result Value Ref Range    D-Dimer, Quantitative 0.44 0.00 - 0.63 mg/L (FEU)   CBC Auto Differential    Specimen: Blood   Result Value Ref Range    WBC 8.10 3.40 - 10.80 10*3/mm3    RBC 3.90 (L) 4.14 - 5.80 10*6/mm3    Hemoglobin 13.4 13.0 - 17.7 g/dL    Hematocrit 39.4 37.5 - 51.0 %    .0 (H) 79.0 - 97.0 fL    MCH 34.4  (H) 26.6 - 33.0 pg    MCHC 34.0 31.5 - 35.7 g/dL    RDW 12.8 12.3 - 15.4 %    RDW-SD 44.6 37.0 - 54.0 fl    MPV 7.7 6.0 - 12.0 fL    Platelets 218 140 - 450 10*3/mm3    Neutrophil % 63.8 42.7 - 76.0 %    Lymphocyte % 20.9 19.6 - 45.3 %    Monocyte % 11.6 5.0 - 12.0 %    Eosinophil % 2.7 0.3 - 6.2 %    Basophil % 1.0 0.0 - 1.5 %    Neutrophils, Absolute 5.10 1.70 - 7.00 10*3/mm3    Lymphocytes, Absolute 1.70 0.70 - 3.10 10*3/mm3    Monocytes, Absolute 0.90 0.10 - 0.90 10*3/mm3    Eosinophils, Absolute 0.20 0.00 - 0.40 10*3/mm3    Basophils, Absolute 0.10 0.00 - 0.20 10*3/mm3    nRBC 0.0 0.0 - 0.2 /100 WBC   High Sensitivity Troponin T 2Hr    Specimen: Blood   Result Value Ref Range    HS Troponin T 43 (H) <15 ng/L    Troponin T Delta 14 (C) >=-4 - <+4 ng/L   ECG 12 Lead Chest Pain   Result Value Ref Range    QT Interval 379 ms     XR Chest 1 View    Result Date: 8/1/2023  Impression: 1.An acute pulmonary process is not apparent. Electronically Signed: Neville Kennedy  8/1/2023 7:19 PM EDT  Workstation ID: [id number]                                        Medical Decision Making  My chest x-ray interpretation shows no cardiomegaly effusion or infiltrate.  Patient has elevated of his troponin greater than 14 over 2 hours time.  There is no acute EKG change.  Metabolic panel is at baseline without renal insufficiency or electrolyte abnormality.  There is no evidence acute infectious process.  Patient will be admitted for further cardiac evaluation.  He is pain-free at this time however it is concerning about his troponin elevation.  I did speak to the on-call hospitalist.    Amount and/or Complexity of Data Reviewed  Labs: ordered. Decision-making details documented in ED Course.  Radiology: ordered and independent interpretation performed.  ECG/medicine tests: ordered and independent interpretation performed.    Risk  Prescription drug management.  Decision regarding hospitalization.        Final diagnoses:   Chest pain,  unspecified type       ED Disposition  ED Disposition       ED Disposition   Decision to Admit    Condition   --    Comment   --               No follow-up provider specified.       Medication List      No changes were made to your prescriptions during this visit.            Solo Laguna MD  08/01/23 8250

## 2023-08-02 NOTE — H&P (VIEW-ONLY)
Referring Provider: Benton Sargent DO  Reason for Consultation:  Chest pain  Hypertension  Dyslipidemia    Patient Care Team:  Provider, No Known as PCP - General    Chief complaint  Chest pain    Subjective .     History of present illness:  Bijan Patel is a 62 y.o. male who presents with history of multiple medical problems including hypertension GERD dyslipidemia celiac disease and status post COVID 19 March 2023 was admitted to the hospital with history of chest pain.  Patient was in [place] and on the way back to [place] his home place and he started developing chest pain while he was in the car as a passenger.  Patient apparently spends 6 months in [place] and had cardiac work-up performed by cardiologist locally.  CT scan that showed calcium score of 700 and had negative stress Cardiolite test (by history) and apparently normal echocardiogram.  Patient was active and walking around and did not have any chest pain.  On the way patient developed recurrent episodes of chest pain substernal dull sharp pain and some heaviness not associated with any sweating nausea.  Apparently was lasting for few minutes and subsequently was lasting longer.  Patient was brought to the hospital.  EKG showed no acute changes.  Minimal elevation of troponin at 43 with a delta of 14.  Cardiology consultation was requested.          ROS      Patient is not having any shortness of breath, palpitations, dizziness or syncope.  Denies having any headache, abdominal pain, nausea, vomiting, diarrhea, constipation, loss of weight or loss of appetite.  Denies having any excessive bruising, hematuria or blood in the stool.    Review of all systems negative except as indicated      History  Past Medical History:   Diagnosis Date    Celiac disease     GERD (gastroesophageal reflux disease)     Hyperlipidemia     Hypertension        Past Surgical History:   Procedure Laterality Date    CHOLECYSTECTOMY      COMMON BILE DUCT EXPLORATION       common bile duct removed    ERCP      ROTATOR CUFF REPAIR         History reviewed. No pertinent family history.    Social History     Tobacco Use    Smoking status: Never    Smokeless tobacco: Never   Vaping Use    Vaping Use: Never used   Substance Use Topics    Alcohol use: Yes     Alcohol/week: 9.0 standard drinks     Types: 9 Shots of liquor per week    Drug use: Never        Medications Prior to Admission   Medication Sig Dispense Refill Last Dose    aspirin 81 MG chewable tablet Chew 1 tablet Daily.       Cholecalciferol 25 MCG (1000 UT) tablet Take 1 tablet by mouth Daily.       famotidine (PEPCID) 20 MG tablet Take 1 tablet by mouth 2 (Two) Times a Day.       folic acid (FOLVITE) 1 MG tablet Take 1 tablet by mouth Daily.       losartan (COZAAR) 50 MG tablet Take 2 tablets by mouth Daily.       propranolol LA (INDERAL LA) 60 MG 24 hr capsule Take 1 capsule by mouth Daily.       rosuvastatin (CRESTOR) 5 MG tablet Take 1 tablet by mouth Daily.            Penicillins    Scheduled Meds:aspirin, 81 mg, Oral, Nightly  cholecalciferol, 1,000 Units, Oral, Daily  famotidine, 20 mg, Oral, BID  folic acid, 1 mg, Oral, Nightly  losartan, 100 mg, Oral, Nightly  propranolol LA, 60 mg, Oral, Nightly  rosuvastatin, 5 mg, Oral, Nightly  senna-docusate sodium, 2 tablet, Oral, BID  sodium chloride, 10 mL, Intravenous, Q12H      Continuous Infusions:   PRN Meds:.  acetaminophen    senna-docusate sodium **AND** polyethylene glycol **AND** bisacodyl **AND** bisacodyl    clotrimazole-betamethasone    nitroglycerin    ondansetron    [COMPLETED] Insert Peripheral IV **AND** sodium chloride    sodium chloride    sodium chloride    Objective     VITAL SIGNS  Vitals:    08/01/23 2130 08/01/23 2145 08/02/23 0011 08/02/23 0343   BP: 140/84 132/78 135/72 112/68   BP Location:   Left arm Left arm   Patient Position:   Lying Lying   Pulse: 80 88 76 84   Resp:   18 15   Temp:   98.5 øF (36.9 øC) 97.7 øF (36.5 øC)   TempSrc:   Oral Oral  "  SpO2: 99% 98% 97% 98%   Weight:       Height:           Flowsheet Rows      Flowsheet Row First Filed Value   Admission Height 182.9 cm (72\") Documented at 08/01/2023 1840   Admission Weight 79.4 kg (175 lb) Documented at 08/01/2023 1840            No intake or output data in the 24 hours ending 08/02/23 0624     TELEMETRY: Sinus rhythm    Physical Exam:  The patient is alert, oriented and in no distress.  Vital signs as noted above.  Head and neck revealed no carotid bruits or jugular venous distention.  No thyromegaly or lymphadenopathy is present  Lungs clear.  No wheezing.  Breath sounds are normal bilaterally.  Heart normal first and second heart sounds. No murmur.  No precordial rub is present.  No gallop is present.  Abdomen soft and nontender.  No organomegaly is present.  Extremities with good peripheral pulses without any pedal edema.  Skin warm and dry.  Musculoskeletal system is grossly normal  CNS grossly normal      Results Review:   I reviewed the patient's new clinical results.  Lab Results (last 24 hours)       Procedure Component Value Units Date/Time    High Sensitivity Troponin T 2Hr [[id number]]  (Abnormal) Collected: 08/01/23 2052    Specimen: Blood Updated: 08/01/23 2126     HS Troponin T 43 ng/L      Troponin T Delta 14 ng/L     Narrative:      High Sensitive Troponin T Reference Range:  <10.0 ng/L- Negative Female for AMI  <15.0 ng/L- Negative Male for AMI  >=10 - Abnormal Female indicating possible myocardial injury.  >=15 - Abnormal Male indicating possible myocardial injury.   Clinicians would have to utilize clinical acumen, EKG, Troponin, and serial changes to determine if it is an Acute Myocardial Infarction or myocardial injury due to an underlying chronic condition.         D-dimer, Quantitative [[id number]]  (Normal) Collected: 08/01/23 1905    Specimen: Blood Updated: 08/01/23 1937     D-Dimer, Quantitative 0.44 mg/L (FEU)     Narrative:      According to the assay 's " "published package insert, a normal (<0.50 mg/L (FEU)) D-dimer result in conjunction with a non-high clinical probability assessment, excludes deep vein thrombosis (DVT) and pulmonary embolism (PE) with high sensitivity.    D-dimer values increase with age and this can make VTE exclusion of an older population difficult. To address this, the [employer], based on best available evidence and recent guidelines, recommends that clinicians use age-adjusted D-dimer thresholds in patients greater than 50 years of age with: a) a low probability of PE who do not meet all Pulmonary Embolism Rule Out Criteria, or b) in those with intermediate probability of PE.   The formula for an age-adjusted D-dimer cut-off is \"age/100\".  For example, a 60 year old patient would have an age-adjusted cut-off of 0.60 mg/L (FEU) and an 80 year old 0.80 mg/L (FEU).    Basic Metabolic Panel [[id number]]  (Abnormal) Collected: 08/01/23 1905    Specimen: Blood Updated: 08/01/23 1928     Glucose 101 mg/dL      BUN 16 mg/dL      Creatinine 1.13 mg/dL      Sodium 135 mmol/L      Potassium 4.5 mmol/L      Comment: Slight hemolysis detected by analyzer. Results may be affected.        Chloride 95 mmol/L      CO2 25.0 mmol/L      Calcium 9.4 mg/dL      BUN/Creatinine Ratio 14.2     Anion Gap 15.0 mmol/L      eGFR 73.0 mL/min/1.73     Narrative:      GFR Normal >60  Chronic Kidney Disease <60  Kidney Failure <15      High Sensitivity Troponin T [[id number]]  (Abnormal) Collected: 08/01/23 1905    Specimen: Blood Updated: 08/01/23 1928     HS Troponin T 29 ng/L     Narrative:      High Sensitive Troponin T Reference Range:  <10.0 ng/L- Negative Female for AMI  <15.0 ng/L- Negative Male for AMI  >=10 - Abnormal Female indicating possible myocardial injury.  >=15 - Abnormal Male indicating possible myocardial injury.   Clinicians would have to utilize clinical acumen, EKG, Troponin, and serial changes to determine if it is an Acute " Myocardial Infarction or myocardial injury due to an underlying chronic condition.         CBC & Differential [[id number]]  (Abnormal) Collected: 08/01/23 1905    Specimen: Blood Updated: 08/01/23 1910    Narrative:      The following orders were created for panel order CBC & Differential.  Procedure                               Abnormality         Status                     ---------                               -----------         ------                     CBC Auto Differential[[id number]]        Abnormal            Final result                 Please view results for these tests on the individual orders.    CBC Auto Differential [[id number]]  (Abnormal) Collected: 08/01/23 1905    Specimen: Blood Updated: 08/01/23 1910     WBC 8.10 10*3/mm3      RBC 3.90 10*6/mm3      Hemoglobin 13.4 g/dL      Hematocrit 39.4 %      .0 fL      MCH 34.4 pg      MCHC 34.0 g/dL      RDW 12.8 %      RDW-SD 44.6 fl      MPV 7.7 fL      Platelets 218 10*3/mm3      Neutrophil % 63.8 %      Lymphocyte % 20.9 %      Monocyte % 11.6 %      Eosinophil % 2.7 %      Basophil % 1.0 %      Neutrophils, Absolute 5.10 10*3/mm3      Lymphocytes, Absolute 1.70 10*3/mm3      Monocytes, Absolute 0.90 10*3/mm3      Eosinophils, Absolute 0.20 10*3/mm3      Basophils, Absolute 0.10 10*3/mm3      nRBC 0.0 /100 WBC             Imaging Results (Last 24 Hours)       Procedure Component Value Units Date/Time    XR Chest 1 View [[id number]] Collected: 08/01/23 1918     Updated: 08/01/23 1921    Narrative:      XR CHEST 1 VW    Date of Exam: 8/1/2023 7:12 PM EDT    Indication: Chest pain    Comparison: None available.    Findings:  The heart is not enlarged. The lungs seem clear. There are no pleural effusions.      Impression:      Impression:  1.An acute pulmonary process is not apparent.      Electronically Signed: Neville Kennedy    8/1/2023 7:19 PM EDT    Workstation ID: [id number]        LAB RESULTS (LAST 7 DAYS)    CBC  Results from last 7 days   Lab  Units 08/01/23  1905   WBC 10*3/mm3 8.10   RBC 10*6/mm3 3.90*   HEMOGLOBIN g/dL 13.4   HEMATOCRIT % 39.4   MCV fL 101.0*   PLATELETS 10*3/mm3 218       BMP  Results from last 7 days   Lab Units 08/01/23  1905   SODIUM mmol/L 135*   POTASSIUM mmol/L 4.5   CHLORIDE mmol/L 95*   CO2 mmol/L 25.0   BUN mg/dL 16   CREATININE mg/dL 1.13   GLUCOSE mg/dL 101*       CMP   Results from last 7 days   Lab Units 08/01/23  1905   SODIUM mmol/L 135*   POTASSIUM mmol/L 4.5   CHLORIDE mmol/L 95*   CO2 mmol/L 25.0   BUN mg/dL 16   CREATININE mg/dL 1.13   GLUCOSE mg/dL 101*         BNP        TROPONIN  Results from last 7 days   Lab Units 08/01/23 2052   HSTROP T ng/L 43*       CoAg        Creatinine Clearance  Estimated Creatinine Clearance: 75.1 mL/min (by C-G formula based on SCr of 1.13 mg/dL).    ABG        Radiology  XR Chest 1 View    Result Date: 8/1/2023  Impression: 1.An acute pulmonary process is not apparent. Electronically Signed: Neville Kennedy  8/1/2023 7:19 PM EDT  Workstation ID: [id number]       EKG      I personally viewed and interpreted the patient's EKG/Telemetry data: Sinus rhythm without ischemic changes    ECHOCARDIOGRAM:        STRESS TEST        Cardiolite (Tc-99m sestamibi) stress test    HEART CATHETERIZATION  No results found for this or any previous visit.      OTHER:     Assessment & Plan     Principal Problem:    Chest pain  ]]]]]]]]]]]]]]]]]]]]]  Impression  =================  -Recurrent episodes of chest pain at rest-possible unstable angina pectoris although symptoms are somewhat atypical.  EKG showed no acute changes.  High-sensitivity troponin 29 and 43 with a delta of 14.    History of negative stress Cardiolite test and normal echocardiogram-performed in [place].  (By history)    - Hypertension dyslipidemia celiac disease GERD.    - Status post COVID March 2023.    - Status post cholecystectomy, bile duct stone removal rotator cuff repair.    - Family history of coronary artery disease    -  Non-smoker    - Allergic to penicillin.  ================  Plan  ============  Patient has chest pain acrania unstable pattern although somewhat atypical.  EKG showed no acute changes.  Minimal elevation of high-sensitivity troponin with delta of 14.  Recent ischemic cardiac work-up was negative for myocardial ischemia April 2023.  (By history).  Reports not available.  In view of recurrent episodes of chest pain and minimal elevation of high-sensitivity troponin patient was advised cardiac catheterization and coronary arteriography for definite evaluation of coronary artery status.  Risks and benefits pros and cons of the procedure including infection bleeding blood clot heart attack stroke allergic reaction to the dye renal dysfunction etc. were discussed.  We will try to obtain records from [place] regarding ischemic cardiac work-up.  Further plan will depend on patient's progress.  ]]]]]]]]]]]]]]]]]]]]]]]         Neil Pineda MD  08/02/23  06:24 EDT

## 2023-08-02 NOTE — CASE MANAGEMENT/SOCIAL WORK
Discharge Planning Assessment  [facility]     Patient Name: Bijan Patel  MRN: [id number]  Today's Date: 8/2/2023    Admit Date: 8/1/2023    Plan: DC Plan: Home with spouse   Discharge Needs Assessment       Row Name 08/02/23 1341       Living Environment    People in Home spouse    Current Living Arrangements home    Potentially Unsafe Housing Conditions none    Primary Care Provided by self    Provides Primary Care For no one    Family Caregiver if Needed spouse    Quality of Family Relationships helpful;involved;supportive                   Discharge Plan       Row Name 08/02/23 1325       Plan    Plan DC Plan: Home with spouse    Patient/Family in Agreement with Plan yes    Plan Comments Met with patient and spouse at bedside.  Lives at home with spouse. IADL.   Denies Discharge needs.   Patient has PCP Lisa Hoang in [place], [place], office number is [phone].   Patient and spouse were on way from [place] to [place] when came to [facility].   Agreeable to Meds to beds.  Barriers to discharge: Heart cath today at 1600.                  Continued Care and Services - Admitted Since 8/1/2023    Coordination has not been started for this encounter.       Expected Discharge Date and Time       Expected Discharge Date Expected Discharge Time    Aug 3, 2023            Demographic Summary       Row Name 08/02/23 1341       General Information    Admission Type observation    Arrived From emergency department    Referral Source admission list    Reason for Consult discharge planning    Preferred Language English                   Functional Status       Row Name 08/02/23 1341       Functional Status    Usual Activity Tolerance excellent    Current Activity Tolerance excellent       Functional Status, IADL    Medications independent    Meal Preparation independent    Housekeeping independent    Laundry independent    Shopping independent       Mental Status    General Appearance WDL WDL       Mental Status Summary    Recent Changes  in Mental Status/Cognitive Functioning no changes             Met with patient in room.    Maintained distance greater than six feet and spent less than 15 minutes in the room.     Danielle Cooper RN     Office Phone [phone]  Office Cell [phone]

## 2023-08-03 VITALS
WEIGHT: 177.25 LBS | SYSTOLIC BLOOD PRESSURE: 115 MMHG | DIASTOLIC BLOOD PRESSURE: 66 MMHG | RESPIRATION RATE: 12 BRPM | HEART RATE: 65 BPM | BODY MASS INDEX: 24.01 KG/M2 | OXYGEN SATURATION: 99 % | TEMPERATURE: 97.4 F | HEIGHT: 72 IN

## 2023-08-03 LAB
ANION GAP SERPL CALCULATED.3IONS-SCNC: 12 MMOL/L (ref 5–15)
BUN SERPL-MCNC: 12 MG/DL (ref 8–23)
BUN/CREAT SERPL: 10.4 (ref 7–25)
CALCIUM SPEC-SCNC: 8.5 MG/DL (ref 8.6–10.5)
CHLORIDE SERPL-SCNC: 99 MMOL/L (ref 98–107)
CO2 SERPL-SCNC: 26 MMOL/L (ref 22–29)
CREAT SERPL-MCNC: 1.15 MG/DL (ref 0.76–1.27)
EGFRCR SERPLBLD CKD-EPI 2021: 71.5 ML/MIN/1.73
GLUCOSE SERPL-MCNC: 111 MG/DL (ref 65–99)
INR PPP: 1.01 (ref 0.93–1.1)
POTASSIUM SERPL-SCNC: 4.3 MMOL/L (ref 3.5–5.2)
PROTHROMBIN TIME: 10.8 SECONDS (ref 9.6–11.7)
SODIUM SERPL-SCNC: 137 MMOL/L (ref 136–145)
WHOLE BLOOD HOLD SPECIMEN: NORMAL

## 2023-08-03 PROCEDURE — 80048 BASIC METABOLIC PNL TOTAL CA: CPT | Performed by: INTERNAL MEDICINE

## 2023-08-03 PROCEDURE — 99232 SBSQ HOSP IP/OBS MODERATE 35: CPT | Performed by: INTERNAL MEDICINE

## 2023-08-03 PROCEDURE — G0378 HOSPITAL OBSERVATION PER HR: HCPCS

## 2023-08-03 PROCEDURE — 85610 PROTHROMBIN TIME: CPT | Performed by: INTERNAL MEDICINE

## 2023-08-03 NOTE — CASE MANAGEMENT/SOCIAL WORK
Case Management Discharge Note      Final Note: Routine home         Transportation Services  Private: Car    Final Discharge Disposition Code: 01 - home or self-care

## 2023-08-03 NOTE — DISCHARGE SUMMARY
"             [facility] Medicine Services  Discharge Summary    Date of Service: 23    Patient Name: Bijan Patel  : 1960  MRN: [id number]    Date of Admission: 2023  Discharge Diagnosis: Atypical chest pain - resolved  Date of Discharge:  23    Primary Care Physician: Provider, No Known      Presenting Problem:   Chest pain [R07.9]  Chest pain, unspecified type [R07.9]    Active and Resolved Hospital Problems:  Active Hospital Problems    Diagnosis POA    **Chest pain [R07.9] Yes    Essential hypertension [I10] Unknown    Dyslipidemia [E78.5] Unknown      Resolved Hospital Problems   No resolved problems to display.         Hospital Course     Hospital Course:  Bijan Patel is a 62 y.o. male with past medical history acid reflux, hypertension, hyperlipidemia and celiac's disease and diagnosis of COVID-19 in March who presented to [facility] on 2023 complaining of chest pain.   Patient reports his primary residence is in [place] but he also spends 6 months of the year in [place].  He was driving through the area on the way home from [place] when he had acute onset of chest pain that caused him to pull to the side of the road and called EMS for help. Patient verbalizes that approximately 3 months ago he has had extensive work-up in [place] by Dr.Vivek Robison cardiologist in [place] [phone].  Patient reports underwent a treadmill stress test, sonogram that only showed \"plaque\".  He is followed by pulmonologist also and has had CAT scans chest x-rays and pulmonary function test.  He is pending sleep apnea test.   Patient reports was riding in the passenger seat of the vehicle when he all of a sudden had mid chest pain that intensified to a sharp pain and radiated down both arms and fingers and up to the neck.  He reports he had 3 \"attacks\" within an hour and a half that lasted approximately 12 to 13 minutes each.  Pain was associated with diaphoresis.  Denies any " "nausea or vomiting headache or vision changes.  By the time EMS arrived pain had subsided.  Patient reports been having worsening symptoms of \"exhaustion\" with activity.  Difficulty ambulating up a flight of stairs. Denies any tobacco use.  Uses alcohol socially.  Father with heart disease in his 70s and a CVA in his 60s.    Patient was monitored in telemetry with no events and didn't have any chest pain episodes since admission. Patient was evaluated by cardiology in the hospital and had cardiac cath done on 08/02/23 which revealed no CAD. Patient stable for discharge and advised to follow up outpatient with his PCP and cardiologist.        Day of Discharge     Vital Signs:  Temp:  [97.4 øF (36.3 øC)-98.2 øF (36.8 øC)] 97.4 øF (36.3 øC)  Heart Rate:  [65-87] 65  Resp:  [12-20] 12  BP: (105-163)/(50-87) 115/66    Physical Exam:  Physical Exam  Constitutional:       Appearance: Normal appearance.   HENT:      Head: Normocephalic and atraumatic.      Mouth/Throat:      Mouth: Mucous membranes are moist.   Eyes:      Extraocular Movements: Extraocular movements intact.      Pupils: Pupils are equal, round, and reactive to light.   Cardiovascular:      Rate and Rhythm: Normal rate and regular rhythm.      Pulses: Normal pulses.      Heart sounds: Normal heart sounds.   Pulmonary:      Effort: Pulmonary effort is normal.      Breath sounds: Normal breath sounds.   Abdominal:      General: Bowel sounds are normal.      Palpations: Abdomen is soft.   Musculoskeletal:         General: Normal range of motion.      Cervical back: Normal range of motion and neck supple.   Skin:     General: Skin is warm.      Capillary Refill: Capillary refill takes less than 2 seconds.   Neurological:      General: No focal deficit present.      Mental Status: He is alert and oriented to person, place, and time.   Psychiatric:         Mood and Affect: Mood normal.          Pertinent  and/or Most Recent Results     LAB RESULTS:      Lab " 08/03/23  0230 08/01/23 1905   WBC  --  8.10   HEMOGLOBIN  --  13.4   HEMATOCRIT  --  39.4   PLATELETS  --  218   NEUTROS ABS  --  5.10   LYMPHS ABS  --  1.70   MONOS ABS  --  0.90   EOS ABS  --  0.20   MCV  --  101.0*   PROTIME 10.8  --          Lab 08/03/23  0230 08/02/23  1827 08/02/23 0755 08/01/23 1905   SODIUM 137  --   --  135*   POTASSIUM 4.3  --   --  4.5   CHLORIDE 99  --   --  95*   CO2 26.0  --   --  25.0   ANION GAP 12.0  --   --  15.0   BUN 12  --   --  16   CREATININE 1.15  --   --  1.13   EGFR 71.5  --   --  73.0   GLUCOSE 111*  --   --  101*   CALCIUM 8.5*  --   --  9.4   MAGNESIUM  --  1.7 1.1*  --    HEMOGLOBIN A1C  --   --  5.10  --              Lab 08/03/23 0230 08/02/23 0755 08/01/23 2052 08/01/23 1905   HSTROP T  --  22* 43* 29*   PROTIME 10.8  --   --   --    INR 1.01  --   --   --          Lab 08/02/23 0755   CHOLESTEROL 153   LDL CHOL 56   HDL CHOL 75*   TRIGLYCERIDES 128             Brief Urine Lab Results       None          Microbiology Results (last 10 days)       ** No results found for the last 240 hours. **            XR Chest 1 View    Result Date: 8/1/2023  Impression: Impression: 1.An acute pulmonary process is not apparent. Electronically Signed: Neville Marcia  8/1/2023 7:19 PM EDT  Workstation ID: [id number]                 Labs Pending at Discharge:      Procedures Performed  Procedure(s):  Left Heart Cath and coronary angiogram  FINDINGS:     1. HEMODYNAMICS:  Left ventricle end-diastolic pressure is normal.  No gradient was noted across the aortic valve.     2. LEFT VENTRICULOGRAPHY:  Left ventricular size and contractility is normal with ejection fraction of 60%.  No mitral regurgitation is present.     3. CORONARY ANGIOGRAPHY:  Left main coronary artery is normal.  Left anterior descending artery is normal.  Circumflex coronary artery is normal.  Right coronary artery is a large and dominant vessel and is normal.     SUMMARY:  Normal left ventricular size and  contractility with ejection fraction of 60%.  Normal epicardial coronary arteries.        RECOMMENDATIONS:  Noncardiac work-up.      Consults:   Consults       Date and Time Order Name Status Description    8/2/2023  1:17 AM Inpatient Cardiology Consult      8/1/2023  9:26 PM Hospitalist (on-call MD unless specified)                Discharge Details        Discharge Medications        Continue These Medications        Instructions Start Date   aspirin 81 MG chewable tablet   81 mg, Oral, Daily      cholecalciferol 25 MCG (1000 UT) tablet  Commonly known as: VITAMIN D3   1,000 Units, Oral, Daily      famotidine 20 MG tablet  Commonly known as: PEPCID   20 mg, Oral, 2 Times Daily      folic acid 1 MG tablet  Commonly known as: FOLVITE   1 mg, Oral, Daily      losartan 50 MG tablet  Commonly known as: COZAAR   100 mg, Oral, Daily      propranolol LA 60 MG 24 hr capsule  Commonly known as: INDERAL LA   60 mg, Oral, Daily      rosuvastatin 5 MG tablet  Commonly known as: CRESTOR   5 mg, Oral, Daily               Allergies   Allergen Reactions    Penicillins Hives         Discharge Disposition:   Home or Self Care    Diet:  Hospital:  Diet Order   Procedures    Diet: Regular/House Diet; Texture: Regular Texture (IDDSI 7); Fluid Consistency: Thin (IDDSI 0)         Discharge Activity: Increase as tolerated        CODE STATUS:  Code Status and Medical Interventions:   Ordered at: 08/01/23 8799     Code Status (Patient has no pulse and is not breathing):    CPR (Attempt to Resuscitate)     Medical Interventions (Patient has pulse or is breathing):    Full Support     Release to patient:    Routine Release         No future appointments.        Time spent on Discharge including face to face service:  20 minutes  Discussed the plan of care with the patient. He agrees with plan.      08/03/23      Signature: Electronically signed by Ricki Montes De Oca MD, 08/03/23, 08:45 EDT.  [facility] Hospitalist Team

## 2023-08-03 NOTE — PROGRESS NOTES
Referring Provider: Matt Loera MD    Reason for follow-up:  Chest pain     Patient Care Team:  Provider, No Known as PCP - General    Subjective .      ROS    The patient has been without any chest discomfort ,shortness of breath, palpitations, dizziness or syncope.  Denies having any headache ,abdominal pain ,nausea, vomiting , diarrhea constipation, loss of weight or loss of appetite.  Denies having any excessive bruising ,hematuria or blood in the stool.    Review of all systems negative except as indicated    History  Past Medical History:   Diagnosis Date    Celiac disease     GERD (gastroesophageal reflux disease)     Hyperlipidemia     Hypertension        Past Surgical History:   Procedure Laterality Date    CHOLECYSTECTOMY      COMMON BILE DUCT EXPLORATION      common bile duct removed    ERCP      ROTATOR CUFF REPAIR         History reviewed. No pertinent family history.    Social History     Tobacco Use    Smoking status: Never    Smokeless tobacco: Never   Vaping Use    Vaping Use: Never used   Substance Use Topics    Alcohol use: Yes     Alcohol/week: 9.0 standard drinks     Types: 9 Shots of liquor per week    Drug use: Never        Medications Prior to Admission   Medication Sig Dispense Refill Last Dose    aspirin 81 MG chewable tablet Chew 1 tablet Daily.       Cholecalciferol 25 MCG (1000 UT) tablet Take 1 tablet by mouth Daily.       famotidine (PEPCID) 20 MG tablet Take 1 tablet by mouth 2 (Two) Times a Day.       folic acid (FOLVITE) 1 MG tablet Take 1 tablet by mouth Daily.       losartan (COZAAR) 50 MG tablet Take 2 tablets by mouth Daily.       propranolol LA (INDERAL LA) 60 MG 24 hr capsule Take 1 capsule by mouth Daily.       rosuvastatin (CRESTOR) 5 MG tablet Take 1 tablet by mouth Daily.          Allergies  Penicillins    Scheduled Meds:aspirin, 81 mg, Oral, Nightly  cholecalciferol, 1,000 Units, Oral, Nightly  famotidine, 20 mg, Oral, BID  folic acid, 1 mg, Oral, Nightly  losartan, 100  "mg, Oral, Nightly  propranolol LA, 60 mg, Oral, Nightly  rosuvastatin, 5 mg, Oral, Nightly  senna-docusate sodium, 2 tablet, Oral, BID  sodium chloride, 10 mL, Intravenous, Q12H  vitamin B-12, 1,000 mcg, Oral, Daily      Continuous Infusions:   PRN Meds:.  acetaminophen    acetaminophen    atropine    senna-docusate sodium **AND** polyethylene glycol **AND** bisacodyl **AND** bisacodyl    clotrimazole-betamethasone    diphenhydrAMINE    nitroglycerin    ondansetron    ondansetron **OR** ondansetron    [COMPLETED] Insert Peripheral IV **AND** sodium chloride    sodium chloride    sodium chloride    sodium chloride    Objective     VITAL SIGNS  Vitals:    08/02/23 2018 08/02/23 2346 08/02/23 2356 08/03/23 0330   BP: 108/71 132/78 105/50 115/66   BP Location: Left arm Left arm Left arm Right arm   Patient Position: Lying Lying Lying Lying   Pulse: 86 83 76 65   Resp: 18 20 15 12   Temp: 98.2 øF (36.8 øC) 97.6 øF (36.4 øC) 97.5 øF (36.4 øC) 97.4 øF (36.3 øC)   TempSrc: Oral Oral Oral Oral   SpO2: 99% 98% 98% 99%   Weight:    80.4 kg (177 lb 4 oz)   Height:           Flowsheet Rows      Flowsheet Row First Filed Value   Admission Height 182.9 cm (72\") Documented at 08/01/2023 1840   Admission Weight 79.4 kg (175 lb) Documented at 08/01/2023 1840              Intake/Output Summary (Last 24 hours) at 8/3/2023 0640  Last data filed at 8/2/2023 1000  Gross per 24 hour   Intake 120 ml   Output --   Net 120 ml        TELEMETRY: Sinus rhythm    Physical Exam:  The patient is alert, oriented and in no distress.  Vital signs as noted above.  Head and neck revealed no carotid bruits or jugular venous distention.  No thyromegaly or lymphadenopathy is present  Lungs clear.  No wheezing.  Breath sounds are normal bilaterally.  Heart normal first and second heart sounds.  No murmur. No precordial rub is present.  No gallop is present.  Abdomen soft and nontender.  No organomegaly is present.  Extremities with good peripheral pulses " without any pedal edema.  Skin warm and dry.  Musculoskeletal system is grossly normal  CNS grossly normal      Results Review:   I reviewed the patient's new clinical results.  Lab Results (last 24 hours)       Procedure Component Value Units Date/Time    Extra Tubes [[id number]] Collected: 08/03/23 0230    Specimen: Blood, Venous Line Updated: 08/03/23 0331    Narrative:      The following orders were created for panel order Extra Tubes.  Procedure                               Abnormality         Status                     ---------                               -----------         ------                     Lavender Top[[id number]]                                     Final result                 Please view results for these tests on the individual orders.    Lavender Top [[id number]] Collected: 08/03/23 0230    Specimen: Blood Updated: 08/03/23 0331     Extra Tube hold for add-on     Comment: Auto resulted       Protime-INR [[id number]]  (Normal) Collected: 08/03/23 0230    Specimen: Blood Updated: 08/03/23 0322     Protime 10.8 Seconds      INR 1.01    Basic Metabolic Panel [[id number]]  (Abnormal) Collected: 08/03/23 0230    Specimen: Blood Updated: 08/03/23 0316     Glucose 111 mg/dL      BUN 12 mg/dL      Creatinine 1.15 mg/dL      Sodium 137 mmol/L      Potassium 4.3 mmol/L      Chloride 99 mmol/L      CO2 26.0 mmol/L      Calcium 8.5 mg/dL      BUN/Creatinine Ratio 10.4     Anion Gap 12.0 mmol/L      eGFR 71.5 mL/min/1.73     Narrative:      GFR Normal >60  Chronic Kidney Disease <60  Kidney Failure <15      Magnesium [[id number]]  (Normal) Collected: 08/02/23 1827    Specimen: Blood Updated: 08/02/23 1921     Magnesium 1.7 mg/dL     Lipid Panel [[id number]]  (Abnormal) Collected: 08/02/23 0755    Specimen: Blood Updated: 08/02/23 1125     Total Cholesterol 153 mg/dL      Triglycerides 128 mg/dL      HDL Cholesterol 75 mg/dL      LDL Cholesterol  56 mg/dL      VLDL Cholesterol 22 mg/dL      LDL/HDL Ratio 0.70     Narrative:      Cholesterol Reference Ranges  (U.S. Department of Health and Human Services ATP III Classifications)    Desirable          <200 mg/dL  Borderline High    200-239 mg/dL  High Risk          >240 mg/dL      Triglyceride Reference Ranges  (U.S. Department of Health and Human Services ATP III Classifications)    Normal           <150 mg/dL  Borderline High  150-199 mg/dL  High             200-499 mg/dL  Very High        >500 mg/dL    HDL Reference Ranges  (U.S. Department of Health and Human Services ATP III Classifications)    Low     <40 mg/dl (major risk factor for CHD)  High    >60 mg/dl ('negative' risk factor for CHD)        LDL Reference Ranges  (U.S. Department of Health and Human Services ATP III Classifications)    Optimal          <100 mg/dL  Near Optimal     100-129 mg/dL  Borderline High  130-159 mg/dL  High             160-189 mg/dL  Very High        >189 mg/dL    Hemoglobin A1c [[id number]]  (Normal) Collected: 08/02/23 0755    Specimen: Blood Updated: 08/02/23 1122     Hemoglobin A1C 5.10 %     Magnesium [[id number]]  (Abnormal) Collected: 08/02/23 0755    Specimen: Blood Updated: 08/02/23 0851     Magnesium 1.1 mg/dL     High Sensitivity Troponin T [[id number]]  (Abnormal) Collected: 08/02/23 0755    Specimen: Blood Updated: 08/02/23 0825     HS Troponin T 22 ng/L     Narrative:      High Sensitive Troponin T Reference Range:  <10.0 ng/L- Negative Female for AMI  <15.0 ng/L- Negative Male for AMI  >=10 - Abnormal Female indicating possible myocardial injury.  >=15 - Abnormal Male indicating possible myocardial injury.   Clinicians would have to utilize clinical acumen, EKG, Troponin, and serial changes to determine if it is an Acute Myocardial Infarction or myocardial injury due to an underlying chronic condition.                 Imaging Results (Last 24 Hours)       ** No results found for the last 24 hours. **        LAB RESULTS (LAST 7 DAYS)    CBC  Results from last 7 days   Lab Units  08/01/23  1905   WBC 10*3/mm3 8.10   RBC 10*6/mm3 3.90*   HEMOGLOBIN g/dL 13.4   HEMATOCRIT % 39.4   MCV fL 101.0*   PLATELETS 10*3/mm3 218       BMP  Results from last 7 days   Lab Units 08/03/23  0230 08/02/23  1827 08/02/23  0755 08/01/23  1905   SODIUM mmol/L 137  --   --  135*   POTASSIUM mmol/L 4.3  --   --  4.5   CHLORIDE mmol/L 99  --   --  95*   CO2 mmol/L 26.0  --   --  25.0   BUN mg/dL 12  --   --  16   CREATININE mg/dL 1.15  --   --  1.13   GLUCOSE mg/dL 111*  --   --  101*   MAGNESIUM mg/dL  --  1.7 1.1*  --        CMP   Results from last 7 days   Lab Units 08/03/23  0230 08/01/23  1905   SODIUM mmol/L 137 135*   POTASSIUM mmol/L 4.3 4.5   CHLORIDE mmol/L 99 95*   CO2 mmol/L 26.0 25.0   BUN mg/dL 12 16   CREATININE mg/dL 1.15 1.13   GLUCOSE mg/dL 111* 101*         BNP        TROPONIN  Results from last 7 days   Lab Units 08/02/23  0755   HSTROP T ng/L 22*       CoAg  Results from last 7 days   Lab Units 08/03/23  0230   INR  1.01       Creatinine Clearance  Estimated Creatinine Clearance: 74.8 mL/min (by C-G formula based on SCr of 1.15 mg/dL).    ABG        Radiology  XR Chest 1 View    Result Date: 8/1/2023  Impression: 1.An acute pulmonary process is not apparent. Electronically Signed: Neville Kennedy  8/1/2023 7:19 PM EDT  Workstation ID: [id number]         EKG      I personally viewed and interpreted the patient's EKG/Telemetry data: Sinus rhythm without ischemic changes    ECHOCARDIOGRAM:            STRESS TEST        Cardiolite (Tc-99m sestamibi) stress test    CARDIAC CATHETERIZATION  Results for orders placed during the hospital encounter of 08/01/23    Cardiac Catheterization/Vascular Study    Narrative  CARDIAC CATHETERIZATION REPORT    DATE OF PROCEDURE:    8/2/2023  INDICATION FOR PROCEDURE:  Angina at rest  Hypertension    PROCEDURE PERFORMED:    Left heart catheterization, coronary angiography, left ventriculography    @@  Moderate conscious sedation was utilized with intravenous Versed and  fentanyl administered by registered nurse with continuous ECG, pulse oximetry and hemodynamic monitoring supervised by myself throughout the entire procedure.  Conscious sedation time   30 minutes    I was present with the patient for the duration of moderate sedation and supervised staff who had no other duties and monitored the patient for the entire procedure.    @@  PROCEDURE COMMENTS:    Under usual sterile precautions and 1% Xylocaine infiltration right femoral artery was percutaneously punctured and a 5 [place] vascular sheath was introduced into the right femoral artery.  Left and right coronary arteriography was performed in varying degrees of obliquity followed by left ventricular angiogram.  Hemostasis was obtained and patient was returned to the room with intact pulses.  No complications were noted.    FINDINGS:    1. HEMODYNAMICS:  Left ventricle end-diastolic pressure is normal.  No gradient was noted across the aortic valve.    2. LEFT VENTRICULOGRAPHY:  Left ventricular size and contractility is normal with ejection fraction of 60%.  No mitral regurgitation is present.    3. CORONARY ANGIOGRAPHY:  Left main coronary artery is normal.  Left anterior descending artery is normal.  Circumflex coronary artery is normal.  Right coronary artery is a large and dominant vessel and is normal.    SUMMARY:  Normal left ventricular size and contractility with ejection fraction of 60%.  Normal epicardial coronary arteries.      RECOMMENDATIONS:  Noncardiac work-up.                OTHER:         Assessment & Plan     Principal Problem:    Chest pain  Active Problems:    Essential hypertension    Dyslipidemia      ]]]]]]]]]]]]]]]]]]]]]  Impression  =================  -Recurrent episodes of chest pain at rest-possible unstable angina pectoris although symptoms are somewhat atypical.  EKG showed no acute changes.  High-sensitivity troponin 29 and 43 with a delta of 14.     History of negative stress Cardiolite test and  normal echocardiogram-performed in [place].  (By history)    Cardiac catheterization 8/2/2023 revealed normal coronary arteries and normal left ventricular function.     - Hypertension dyslipidemia celiac disease GERD.     - Status post COVID March 2023.     - Status post cholecystectomy, bile duct stone removal rotator cuff repair.     - Family history of coronary artery disease     - Non-smoker     - Allergic to penicillin.  ================  Plan  ============  Patient has chest pain acrania unstable pattern although somewhat atypical.  EKG showed no acute changes.  Minimal elevation of high-sensitivity troponin with delta of 14.  Recent ischemic cardiac work-up was negative for myocardial ischemia April 2023.  (By history).  Reports not available.  Cardiac catheterization 8/2/2023 revealed normal coronary arteries and normal left ventricular function.  Have discussed the findings and educated patient and patient's wife.    Hypomagnesemia-1.1.  Improved at 1.7.    Okay patient to be discharged today and follow-up with local physician in [place].    Activities limitations etc. were discussed with patient and patient's wife.    Patient would benefit from noncardiac work-up.    Further plan will depend on patient's progress.  ]]]]]]]]]]]]]]]]]]]]]]]     Reviewed and updated 8/3/2023      Neil Pineda MD  08/03/23  06:40 EDT

## 2023-08-03 NOTE — PLAN OF CARE
Problem: Adult Inpatient Plan of Care  Goal: Absence of Hospital-Acquired Illness or Injury  Intervention: Identify and Manage Fall Risk  Description: Perform standard risk assessment on admission using a validated tool or comprehensive approach appropriate to the patient; reassess fall risk frequently, with change in status or transfer to another level of care.  Communicate fall injury risk to interprofessional healthcare team.  Determine need for increased observation, equipment and environmental modification, such as low bed, signage and supportive, nonskid footwear.  Adjust safety measures to individual developmental age, stage and identified risk factors.  Reinforce the importance of safety and physical activity with patient and family.  Perform regular intentional rounding to assess need for position change, pain assessment and personal needs, including assistance with toileting.  Recent Flowsheet Documentation  Taken 8/3/2023 0400 by Nate Roberson, RN  Safety Promotion/Fall Prevention:   assistive device/personal items within reach   clutter free environment maintained   safety round/check completed   room organization consistent  Taken 8/3/2023 0200 by Nate Roberson, RN  Safety Promotion/Fall Prevention:   assistive device/personal items within reach   clutter free environment maintained   safety round/check completed   room organization consistent  Taken 8/3/2023 0000 by Nate Roberson, RN  Safety Promotion/Fall Prevention:   assistive device/personal items within reach   clutter free environment maintained   safety round/check completed   room organization consistent  Taken 8/2/2023 2000 by Nate Roberson, RN  Safety Promotion/Fall Prevention:   assistive device/personal items within reach   clutter free environment maintained   safety round/check completed   room organization consistent  Intervention: Prevent and Manage VTE (Venous Thromboembolism) Risk  Description: Assess for VTE (venous  thromboembolism) risk.  Encourage and assist with early ambulation.  Initiate and maintain compression or other therapy, as indicated, based on identified risk in accordance with organizational protocol and provider order.  Encourage both active and passive leg exercises while in bed, if unable to ambulate.  Recent Flowsheet Documentation  Taken 8/2/2023 2000 by Nate Roberson, RN  Activity Management: up ad holden  Intervention: Prevent Infection  Description: Maintain skin and mucous membrane integrity; promote hand, oral and pulmonary hygiene.  Optimize fluid balance, nutrition, sleep and glycemic control to maximize infection resistance.  Identify potential sources of infection early to prevent or mitigate progression of infection (e.g., wound, lines, devices).  Evaluate ongoing need for invasive devices; remove promptly when no longer indicated.  Recent Flowsheet Documentation  Taken 8/2/2023 2000 by Nate Roberson, RN  Infection Prevention:   single patient room provided   rest/sleep promoted   personal protective equipment utilized   hand hygiene promoted   environmental surveillance performed  Goal: Optimal Comfort and Wellbeing  Intervention: Provide Person-Centered Care  Description: Use a family-focused approach to care.  Develop trust and rapport by proactively providing information, encouraging questions, addressing concerns and offering reassurance.  Acknowledge emotional response to hospitalization.  Recognize and utilize personal coping strategies.  Honor spiritual and cultural preferences.  Recent Flowsheet Documentation  Taken 8/2/2023 2000 by Nate Roberson, RN  Trust Relationship/Rapport:   care explained   choices provided   emotional support provided   empathic listening provided   questions answered   questions encouraged   reassurance provided   thoughts/feelings acknowledged   Goal Outcome Evaluation:      No complaints from patient during the night.  Denies any chest pain or shortness of air.   Vitals WNL.  Normal heart cath yesterday.  Patient expected to discharge home today.   Wife at bedside.

## 2023-08-04 LAB — QT INTERVAL: 392 MS
